# Patient Record
Sex: FEMALE | Race: WHITE | NOT HISPANIC OR LATINO | Employment: OTHER | URBAN - METROPOLITAN AREA
[De-identification: names, ages, dates, MRNs, and addresses within clinical notes are randomized per-mention and may not be internally consistent; named-entity substitution may affect disease eponyms.]

---

## 2017-05-04 ENCOUNTER — ALLSCRIPTS OFFICE VISIT (OUTPATIENT)
Dept: OTHER | Facility: OTHER | Age: 67
End: 2017-05-04

## 2017-05-17 ENCOUNTER — GENERIC CONVERSION - ENCOUNTER (OUTPATIENT)
Dept: OTHER | Facility: OTHER | Age: 67
End: 2017-05-17

## 2017-05-23 ENCOUNTER — ALLSCRIPTS OFFICE VISIT (OUTPATIENT)
Dept: OTHER | Facility: OTHER | Age: 67
End: 2017-05-23

## 2017-05-23 ENCOUNTER — GENERIC CONVERSION - ENCOUNTER (OUTPATIENT)
Dept: OTHER | Facility: OTHER | Age: 67
End: 2017-05-23

## 2017-09-11 ENCOUNTER — GENERIC CONVERSION - ENCOUNTER (OUTPATIENT)
Dept: OTHER | Facility: OTHER | Age: 67
End: 2017-09-11

## 2017-09-11 DIAGNOSIS — Z12.31 ENCOUNTER FOR SCREENING MAMMOGRAM FOR MALIGNANT NEOPLASM OF BREAST: ICD-10-CM

## 2017-10-23 ENCOUNTER — ALLSCRIPTS OFFICE VISIT (OUTPATIENT)
Dept: OTHER | Facility: OTHER | Age: 67
End: 2017-10-23

## 2017-10-24 NOTE — PROGRESS NOTES
Assessment  1  COPD, mild (496) (J44 9)   2  Exertional shortness of breath (786 05) (R06 02)   3  Ear itching (698 9) (L29 9)    Discussion/Summary    Ear itching- advise to take OTC allergy medication she is already on Singulair generic daily advise to continue  Mild and Exertional Shortness of breath- stable with current regime of daily inhaler along with Nebulizer treat,treatment, Placard for disability given to patient  up as needed  Possible side effects of new medications were reviewed with the patient/guardian today  The treatment plan was reviewed with the patient/guardian  The patient/guardian understands and agrees with the treatment plan      Chief Complaint  Patient is here today with complaints of left ear pain and have paperwork for disability placard filled out due to breathing issue      History of Present Illness  Patient is here because her left ear has been bothering her for the past 3 days  She denies any drainage from her ear  It feels itchy says that she cannot breath and walk more than 200 feet without stopping and is requesting a disability placard  She has COPD and is currently on nebulizer treatments along with Bero inhaler daily  Review of Systems    Constitutional: No fever, no chills, feels well, no tiredness, no recent weight gain or weight loss  ENT: as noted in HPI  Cardiovascular: No complaints of slow heart rate, no fast heart rate, no chest pain, no palpitations, no leg claudication, no lower extremity edema  Respiratory: shortness of breath during exertion, but-- as noted in HPI  Gastrointestinal: No complaints of abdominal pain, no constipation, no nausea or vomiting, no diarrhea, no bloody stools  Musculoskeletal: No complaints of arthralgias, no myalgias, no joint swelling or stiffness, no limb pain or swelling  Integumentary: No complaints of skin rash or lesions, no itching, no skin wounds, no breast pain or lump     Neurological: No complaints of headache, no confusion, no convulsions, no numbness, no dizziness or fainting, no tingling, no limb weakness, no difficulty walking  ROS reviewed  Active Problems  1  Anxiety (300 00) (F41 9)   2  Benign hypertension (401 1) (I10)   3  COPD, mild (496) (J44 9)   4  Depression with anxiety (300 4) (F41 8)   5  Encounter for screening mammogram for malignant neoplasm of breast (V76 12)   (Z12 31)   6  Exertional shortness of breath (786 05) (R06 02)   7  GERD (gastroesophageal reflux disease) (530 81) (K21 9)   8  Hypothyroidism (244 9) (E03 9)   9  Need for influenza vaccination (V04 81) (Z23)   10  Need for pneumococcal vaccination (V03 82) (Z23)   11  Recurrent UTI (599 0) (N39 0)   12  Seasonal allergies (477 9) (J30 2)   13  Vaginal dryness (625 8) (N89 8)   14  Vitamin D deficiency (268 9) (E55 9)    Past Medical History    The active problems and past medical history were reviewed and updated today  Family History  Mother    1  Denied: Family history of substance abuse   2  Denied: Family history of Mental problems  Father    3  Family history of cardiac disorder (V17 49) (Z82 49)   4  Denied: Family history of substance abuse   5  Denied: Family history of Mental problems  Sibling    6  Family history of cardiac disorder (V17 49) (Z82 49)    Social History   · Former smoker (E14 44) (U18 381)    Current Meds   1  Albuterol Sulfate (2 5 MG/3ML) 0 083% Inhalation Nebulization Solution; USE 1 UNIT   DOSE EVERY 4-6 HOURS AS NEEDED FOR TPBVVB(151 34) ; Therapy: 79ZXG2331 to (Evaluate:82Xsp9116)  Requested for: 37IAS4272; Last   Rx:15Jun2017 Ordered   2  AmLODIPine Besylate 10 MG Oral Tablet; 1 tab oral every day; Therapy: 50Fht2068 to (Evaluate:10Mar2018)  Requested for: 40Ugn4967; Last   Rx:48Efd1634 Ordered   3  Breo Ellipta 100-25 MCG/INH Inhalation Aerosol Powder Breath Activated; USE 1   INHALATION ONCE DAILY;    Therapy: 14YKQ3326 to (Evaluate:97Edh4828)  Requested for: 22KUW7062; Last   Rx:05Oct2017 Ordered   4  Citalopram Hydrobromide 20 MG Oral Tablet; 1 tab oral every day; Therapy: 52ZBK4904 to (Evaluate:10Mar2018)  Requested for: 83Rui7928; Last   Rx:11Sep2017 Ordered   5  HydrOXYzine Pamoate 25 MG Oral Capsule; TAKE 1 CAPSULE 3 TIMES DAILY; Therapy: 33Rnk4048 to (Evaluate:01Oct2017)  Requested for: 76Moh7932; Last   Rx:12Nbd8418 Ordered   6  Ipratropium-Albuterol 0 5-2 5 (3) MG/3ML Inhalation Solution; use 1 unit dose in nebulizer   every 4 hours as needed; Therapy: 10Enb7056 to (Evaluate:10Dec2017)  Requested for: 03Tvf2866; Last   Rx:11Sep2017 Ordered   7  Levothyroxine Sodium 88 MCG Oral Tablet; TAKE 1 TABLET DAILY AS DIRECTED    Requested for: 11Sep2017; Last Rx:11Sep2017 Ordered   8  Montelukast Sodium 10 MG Oral Tablet; take 1 tablet by mouth once daily; Therapy: 00Usa3221 to (Evaluate:10Nov2017)  Requested for: 34Asm7269; Last   Rx:11Sep2017 Ordered   9  Nebulizer Device; USE AS DIRECTED; Therapy: 80GWU7326 to (Last Rx:23May2017) Ordered   10  Nebulizer Device; USE AS DIRECTED; Therapy: 68ODG0982 to (Last Rx:25May2017) Ordered   11  Nebulizer/Adult Mask KIT; USE AS DIRECTED; Therapy: 67MBH6228 to (Last Rx:23May2017) Ordered   12  Nebulizer/Adult Mask KIT; USE AS DIRECTED; Therapy: 51SBS8890 to (Last Rx:25May2017) Ordered   13  Nebulizer/Tubing/Mouthpiece KIT; USE AS DIRECTED; Therapy: 42SRR5293 to (Last Rx:25May2017) Ordered   14  Nebulizer/Tubing/Mouthpiece KIT; USE AS DIRECTED; Therapy: 87AAI1188 to (Last Rx:25May2017) Ordered   15  Premarin 0 625 MG Oral Tablet; TAKE 2 TABLET Weekly  Requested for: 29OPF3746; Last    Rx:10Oct2017 Ordered   16  RaNITidine HCl - 150 MG Oral Tablet; TAKE 1 TABLET AT BEDTIME; Therapy: 24QXX8480 to (Evaluate:10Mar2018)  Requested for: 85Xxy4344; Last    Rx:72Ijq9667 Ordered   17  Sulfamethoxazole-Trimethoprim 400-80 MG Oral Tablet; TAKE 1 TABLET Other     Requested for: 05ACG2093; Last Rx:97Ceq3034 Ordered   18   Vitamin D3 66102 UNIT Oral Capsule; Therapy: (Recorded:44Dly2422) to Recorded    Allergies  1  Chantix TABS   2  LaMICtal TABS   3  Wellbutrin TABS    Vitals  Vital Signs    Recorded: 27GZX9917 10:58AM   Temperature 98 1 F   Heart Rate 94   Systolic 164   Diastolic 78   Height 5 ft 2 in   Weight 195 lb    BMI Calculated 35 67   BSA Calculated 1 89   O2 Saturation 96     Physical Exam    Constitutional   General appearance: No acute distress, well appearing and well nourished  Eyes   Conjunctiva and lids: No swelling, erythema or discharge  Ears, Nose, Mouth, and Throat   Otoscopic examination: Tympanic membranes translucent with normal light reflex  Canals patent without erythema  Pulmonary   Respiratory effort: No increased work of breathing or signs of respiratory distress  Auscultation of lungs: Clear to auscultation  Cardiovascular   Auscultation of heart: Normal rate and rhythm, normal S1 and S2, without murmurs      Musculoskeletal   Gait and station: Normal     Psychiatric   Orientation to person, place, and time: Normal          Signatures   Electronically signed by : Urszula Salcedo MD; Oct 23 2017 11:25AM EST                       (Author)

## 2017-12-27 ENCOUNTER — GENERIC CONVERSION - ENCOUNTER (OUTPATIENT)
Dept: FAMILY MEDICINE CLINIC | Facility: CLINIC | Age: 67
End: 2017-12-27

## 2018-01-10 ENCOUNTER — ALLSCRIPTS OFFICE VISIT (OUTPATIENT)
Dept: OTHER | Facility: OTHER | Age: 68
End: 2018-01-10

## 2018-01-10 DIAGNOSIS — I10 ESSENTIAL (PRIMARY) HYPERTENSION: ICD-10-CM

## 2018-01-10 DIAGNOSIS — Z12.31 ENCOUNTER FOR SCREENING MAMMOGRAM FOR MALIGNANT NEOPLASM OF BREAST: ICD-10-CM

## 2018-01-10 DIAGNOSIS — J44.9 CHRONIC OBSTRUCTIVE PULMONARY DISEASE (HCC): ICD-10-CM

## 2018-01-10 DIAGNOSIS — Z13.220 ENCOUNTER FOR SCREENING FOR LIPOID DISORDERS: ICD-10-CM

## 2018-01-11 NOTE — PROGRESS NOTES
Assessment   1  Encounter for well woman exam with routine gynecological exam (V72 31) (Z01 419)   2  COPD, mild (496) (J44 9)   3  Lipid screening (V77 91) (Z13 220)    Plan   Anxiety    · HydrOXYzine Pamoate 25 MG Oral Capsule  Benign hypertension    · (1) COMPREHENSIVE METABOLIC PANEL; Status:Active; Requested JOJ:[id number]; Benign hypertension, Lipid screening    · (1) LIPID PANEL, FASTING; Status:Active; Requested AUU:89OOP7569;   COPD, mild    · Azithromycin 500 MG Oral Tablet; TAKE 1 TABLET DAILY   · PredniSONE 20 MG Oral Tablet; take 2 tablet daily   · * XR CHEST PA & LATERAL; Status:Active; Requested IRN:[id number];   Encounter for screening mammogram for malignant neoplasm of breast    · * MAMMO SCREENING BILATERAL W CAD; Status:Hold For - Scheduling; Requested    for:10Jan2018;   Encounter for well woman exam with routine gynecological exam    · 1 - Ulysses Scudder MD, Laura Osborne  (Obstetrics/Gynecology) Co-Management  *  Status: Hold    For - Scheduling  Requested for: 02UWY9736  Care Summary provided  : Yes  Exertional shortness of breath    · Nebulizer Device   · Nebulizer/Adult Mask KIT   · Nebulizer/Tubing/Mouthpiece KIT; USE AS DIRECTED    Discussion/Summary      Mild COPD- after discussing risks and benefits of medications along with side effects will start a trial of Prednisone 40 mg PO X 7 days, Azithromycin to cover for bacterial infection, given an X-ray to do if symptoms continue and advised to use nebulizer treatment as well  woman exam- mammography ordered given along with a referral to OB/Gyn  up in 1 week or as needed  Possible side effects of new medications were reviewed with the patient/guardian today  The treatment plan was reviewed with the patient/guardian   The patient/guardian understands and agrees with the treatment plan      Chief Complaint   Patient is here today with complaints of chest congestion with cough and bringing up some sputum started yesterday      History of Present Illness   Patient is here due to chest congestion congestion and cough  She says that the cough is productive in nature  She says that she has shortness of breath associated with it  She denies any fevers however she has some body aches  She quit smoking 5 years ago, she used to smoke 1 PPD, she smoked for about 30 years  is due for mammography and a pap smear  Review of Systems        Constitutional: No fever, no chills, feels well, no tiredness, no recent weight gain or weight loss  Eyes: No complaints of eye pain, no red eyes, no eyesight problems, no discharge, no dry eyes, no itching of eyes  Cardiovascular: No complaints of slow heart rate, no fast heart rate, no chest pain, no palpitations, no leg claudication, no lower extremity edema  Respiratory: shortness of breath-- and-- cough, but-- as noted in HPI  Gastrointestinal: No complaints of abdominal pain, no constipation, no nausea or vomiting, no diarrhea, no bloody stools  Musculoskeletal: No complaints of arthralgias, no myalgias, no joint swelling or stiffness, no limb pain or swelling  Integumentary: No complaints of skin rash or lesions, no itching, no skin wounds, no breast pain or lump  Neurological: No complaints of headache, no confusion, no convulsions, no numbness, no dizziness or fainting, no tingling, no limb weakness, no difficulty walking  Endocrine: No complaints of proptosis, no hot flashes, no muscle weakness, no deepening of the voice, no feelings of weakness  Hematologic/Lymphatic: No complaints of swollen glands, no swollen glands in the neck, does not bleed easily, does not bruise easily  ROS reviewed  Active Problems   1  Anxiety (300 00) (F41 9)   2  Benign hypertension (401 1) (I10)   3  COPD, mild (496) (J44 9)   4  Depression with anxiety (300 4) (F41 8)   5  Ear itching (698 9) (L29 9)   6   Encounter for screening mammogram for malignant neoplasm of breast (V76 12) (Z12 31)   7  Exertional shortness of breath (786 05) (R06 02)   8  GERD (gastroesophageal reflux disease) (530 81) (K21 9)   9  Hypothyroidism (244 9) (E03 9)   10  Need for influenza vaccination (V04 81) (Z23)   11  Need for pneumococcal vaccination (V03 82) (Z23)   12  Recurrent UTI (599 0) (N39 0)   13  Seasonal allergies (477 9) (J30 2)   14  Shortness of breath on exertion (786 05) (R06 02)   15  Vaginal dryness (625 8) (N89 8)   16  Vitamin D deficiency (268 9) (E55 9)    Past Medical History      The active problems and past medical history were reviewed and updated today  Family History   Mother    1  Denied: Family history of substance abuse   2  Denied: Family history of Mental problems  Father    3  Family history of cardiac disorder (V17 49) (Z82 49)   4  Denied: Family history of substance abuse   5  Denied: Family history of Mental problems  Sibling    6  Family history of cardiac disorder (V17 49) (Z82 49)    Social History    · Former smoker (J44 14) (U23 484)    Current Meds    1  Albuterol Sulfate (2 5 MG/3ML) 0 083% Inhalation Nebulization Solution; USE 1 UNIT     DOSE EVERY 4-6 HOURS AS NEEDED FOR UFVLYN(139 59) ; Therapy: 70YFS5488 to (Evaluate:54Rxr6360)  Requested for: 22CJH3219; Last     Rx:15Jun2017 Ordered   2  AmLODIPine Besylate 10 MG Oral Tablet; 1 tab oral every day; Therapy: 03Gid6928 to (Evaluate:10Mar2018)  Requested for: 15Gwe8669; Last     Rx:73Tfv6356 Ordered   3  Breo Ellipta 100-25 MCG/INH Inhalation Aerosol Powder Breath Activated; USE 1     INHALATION ONCE DAILY; Therapy: 46BEU3332 to (Evaluate:82Dob3865)  Requested for: 08CWG6295; Last     Rx:06Muq7266 Ordered   4  Citalopram Hydrobromide 20 MG Oral Tablet; 1 tab oral every day; Therapy: 80ANA3475 to (Evaluate:10Mar2018)  Requested for: 86Fou2168; Last     Rx:04Nja4636 Ordered   5  Fluticasone Propionate 50 MCG/ACT Nasal Suspension; use 2 sprays in each nostril     once daily;      Therapy: 58FLS2004 to (Evaluate:01Feb2018)  Requested for: 81HQD6766; Last     Rx:02Jan2018 Ordered   6  HydrOXYzine Pamoate 25 MG Oral Capsule; TAKE 1 CAPSULE 3 TIMES DAILY; Therapy: 45Dry3036 to (Evaluate:01Oct2017)  Requested for: 25Oml3510; Last     Rx:93Acl3522 Ordered   7  Ipratropium-Albuterol 0 5-2 5 (3) MG/3ML Inhalation Solution; use 1 unit dose in nebulizer     every 4 hours as needed; Therapy: 88Lmd3505 to (Evaluate:02Yis2891)  Requested for: 35Qcj4595; Last     Rx:10Uwf3216 Ordered   8  Levothyroxine Sodium 88 MCG Oral Tablet; TAKE 1 TABLET DAILY AS DIRECTED      Requested for: 59Unv4616; Last Rx:38Akf0484 Ordered   9  Montelukast Sodium 10 MG Oral Tablet; take 1 tablet by mouth once daily; Therapy: 94Vsl2959 to (Evaluate:10Nov2017)  Requested for: 27Bzv4669; Last     Rx:11Sep2017 Ordered   10  Nebulizer Device; USE AS DIRECTED; Therapy: 67PEU7466 to (Last Rx:46Xob8161) Ordered   11  Nebulizer Device; USE AS DIRECTED; Therapy: 56SNN1716 to (Last Rx:66Rph6467) Ordered   12  Nebulizer/Adult Mask KIT; USE AS DIRECTED; Therapy: 78FLK4510 to (Last Rx:04Nvb6090) Ordered   13  Nebulizer/Adult Mask KIT; USE AS DIRECTED; Therapy: 74BID8741 to (Last Rx:25May2017) Ordered   14  Nebulizer/Tubing/Mouthpiece KIT; USE AS DIRECTED; Therapy: 91UVP7356 to (Last Rx:63Oee6759) Ordered   15  Nebulizer/Tubing/Mouthpiece KIT; USE AS DIRECTED; Therapy: 92JIF8684 to (Last Rx:25May2017) Ordered   16  Premarin 0 625 MG Oral Tablet; TAKE 2 TABLET Weekly  Requested for: 17BKO8837; Last      Rx:10Oct2017 Ordered   17  RaNITidine HCl - 150 MG Oral Tablet; TAKE 1 TABLET AT BEDTIME; Therapy: 79WUA7225 to (Evaluate:10Mar2018)  Requested for: 70Sxc1075; Last      Rx:11Sep2017 Ordered   18  Sulfamethoxazole-Trimethoprim 400-80 MG Oral Tablet; TAKE 1 TABLET Other       Requested for: 32ZXG9769; Last Rx:43Log2673 Ordered   19   Vitamin D3 47698 UNIT Oral Capsule; TAKE 1 CAPSULE Weekly  Requested for:      83VDP6055; Last Rx:26Aom6856 Ordered    Allergies   1  Chantix TABS   2  LaMICtal TABS   3  Wellbutrin TABS    Vitals   Vital Signs    Recorded: 18HXF4639 09:13AM   Temperature 98 2 F   Heart Rate 560   Systolic 616   Diastolic 76   Height 5 ft 2 in   Weight 192 lb 6 oz   BMI Calculated 35 19   BSA Calculated 1 88   O2 Saturation 95     Physical Exam        Constitutional      General appearance: No acute distress, well appearing and well nourished  Eyes      Conjunctiva and lids: No swelling, erythema or discharge  Pulmonary      Respiratory effort: No increased work of breathing or signs of respiratory distress  -- bilateral end exp wheezing  Cardiovascular      Auscultation of heart: Normal rate and rhythm, normal S1 and S2, without murmurs  Musculoskeletal      Gait and station: Normal        Skin      Skin and subcutaneous tissue: Normal without rashes or lesions         Psychiatric      Orientation to person, place, and time: Normal        Mood and affect: Normal           Signatures    Electronically signed by : Ana María Joseph MD; Harjeet 10 2018  9:54AM EST                       (Author)

## 2018-01-13 ENCOUNTER — APPOINTMENT (EMERGENCY)
Dept: RADIOLOGY | Facility: HOSPITAL | Age: 68
DRG: 192 | End: 2018-01-13
Payer: MEDICARE

## 2018-01-13 ENCOUNTER — HOSPITAL ENCOUNTER (INPATIENT)
Facility: HOSPITAL | Age: 68
LOS: 1 days | Discharge: HOME | DRG: 192 | End: 2018-01-14
Attending: FAMILY MEDICINE | Admitting: FAMILY MEDICINE
Payer: MEDICARE

## 2018-01-13 VITALS
HEIGHT: 62 IN | BODY MASS INDEX: 34.78 KG/M2 | OXYGEN SATURATION: 95 % | HEART RATE: 96 BPM | WEIGHT: 189 LBS | DIASTOLIC BLOOD PRESSURE: 74 MMHG | TEMPERATURE: 97.9 F | SYSTOLIC BLOOD PRESSURE: 130 MMHG

## 2018-01-13 VITALS
DIASTOLIC BLOOD PRESSURE: 78 MMHG | BODY MASS INDEX: 35.88 KG/M2 | OXYGEN SATURATION: 96 % | WEIGHT: 195 LBS | SYSTOLIC BLOOD PRESSURE: 136 MMHG | HEART RATE: 94 BPM | HEIGHT: 62 IN | TEMPERATURE: 98.1 F

## 2018-01-13 DIAGNOSIS — J44.1 COPD EXACERBATION (HCC): ICD-10-CM

## 2018-01-13 DIAGNOSIS — J18.9 PNEUMONIA: Primary | ICD-10-CM

## 2018-01-13 PROBLEM — I10 ESSENTIAL HYPERTENSION: Status: ACTIVE | Noted: 2018-01-13

## 2018-01-13 PROBLEM — R06.03 ACUTE RESPIRATORY DISTRESS: Status: ACTIVE | Noted: 2018-01-13

## 2018-01-13 PROBLEM — E03.9 HYPOTHYROIDISM: Status: ACTIVE | Noted: 2018-01-13

## 2018-01-13 LAB
ALBUMIN SERPL BCP-MCNC: 3.7 G/DL (ref 3.5–5)
ALP SERPL-CCNC: 74 U/L (ref 46–116)
ALT SERPL W P-5'-P-CCNC: 28 U/L (ref 12–78)
ANION GAP SERPL CALCULATED.3IONS-SCNC: 11 MMOL/L (ref 4–13)
APTT PPP: 26 SECONDS (ref 23–35)
AST SERPL W P-5'-P-CCNC: 30 U/L (ref 5–45)
BASOPHILS # BLD AUTO: 0 THOUSANDS/ΜL (ref 0–0.1)
BASOPHILS NFR BLD AUTO: 0 % (ref 0–1)
BILIRUB SERPL-MCNC: 0.3 MG/DL (ref 0.2–1)
BUN SERPL-MCNC: 18 MG/DL (ref 5–25)
CALCIUM SERPL-MCNC: 9.3 MG/DL (ref 8.3–10.1)
CHLORIDE SERPL-SCNC: 104 MMOL/L (ref 100–108)
CO2 SERPL-SCNC: 24 MMOL/L (ref 21–32)
CREAT SERPL-MCNC: 0.58 MG/DL (ref 0.6–1.3)
EOSINOPHIL # BLD AUTO: 0 THOUSAND/ΜL (ref 0–0.61)
EOSINOPHIL NFR BLD AUTO: 0 % (ref 0–6)
ERYTHROCYTE [DISTWIDTH] IN BLOOD BY AUTOMATED COUNT: 13.4 % (ref 11.6–15.1)
FLUAV AG SPEC QL: DETECTED
FLUBV AG SPEC QL: ABNORMAL
GFR SERPL CREATININE-BSD FRML MDRD: 95 ML/MIN/1.73SQ M
GLUCOSE SERPL-MCNC: 139 MG/DL (ref 65–140)
HCT VFR BLD AUTO: 41.2 % (ref 34.8–46.1)
HGB BLD-MCNC: 13.6 G/DL (ref 11.5–15.4)
INR PPP: 0.87 (ref 0.86–1.16)
LACTATE SERPL-SCNC: 1.1 MMOL/L (ref 0.5–2)
LIPASE SERPL-CCNC: 140 U/L (ref 73–393)
LYMPHOCYTES # BLD AUTO: 0.34 THOUSANDS/ΜL (ref 0.6–4.47)
LYMPHOCYTES NFR BLD AUTO: 9 % (ref 14–44)
MAGNESIUM SERPL-MCNC: 2.3 MG/DL (ref 1.6–2.6)
MCH RBC QN AUTO: 30.2 PG (ref 26.8–34.3)
MCHC RBC AUTO-ENTMCNC: 33 G/DL (ref 31.4–37.4)
MCV RBC AUTO: 91 FL (ref 82–98)
MONOCYTES # BLD AUTO: 0.26 THOUSAND/ΜL (ref 0.17–1.22)
MONOCYTES NFR BLD AUTO: 7 % (ref 4–12)
NEUTROPHILS # BLD AUTO: 3.33 THOUSANDS/ΜL (ref 1.85–7.62)
NEUTS SEG NFR BLD AUTO: 84 % (ref 43–75)
PLATELET # BLD AUTO: 239 THOUSANDS/UL (ref 149–390)
PMV BLD AUTO: 8.9 FL (ref 8.9–12.7)
POTASSIUM SERPL-SCNC: 4 MMOL/L (ref 3.5–5.3)
PROT SERPL-MCNC: 7.6 G/DL (ref 6.4–8.2)
PROTHROMBIN TIME: 12.1 SECONDS (ref 12.1–14.4)
RBC # BLD AUTO: 4.51 MILLION/UL (ref 3.81–5.12)
RSV B RNA SPEC QL NAA+PROBE: ABNORMAL
SODIUM SERPL-SCNC: 139 MMOL/L (ref 136–145)
TSH SERPL DL<=0.05 MIU/L-ACNC: 0.62 UIU/ML (ref 0.36–3.74)
WBC # BLD AUTO: 3.93 THOUSAND/UL (ref 4.31–10.16)

## 2018-01-13 PROCEDURE — 83605 ASSAY OF LACTIC ACID: CPT | Performed by: PHYSICIAN ASSISTANT

## 2018-01-13 PROCEDURE — 96361 HYDRATE IV INFUSION ADD-ON: CPT

## 2018-01-13 PROCEDURE — 94760 N-INVAS EAR/PLS OXIMETRY 1: CPT

## 2018-01-13 PROCEDURE — 71046 X-RAY EXAM CHEST 2 VIEWS: CPT

## 2018-01-13 PROCEDURE — 94644 CONT INHLJ TX 1ST HOUR: CPT

## 2018-01-13 PROCEDURE — 84443 ASSAY THYROID STIM HORMONE: CPT | Performed by: PHYSICIAN ASSISTANT

## 2018-01-13 PROCEDURE — 80053 COMPREHEN METABOLIC PANEL: CPT | Performed by: FAMILY MEDICINE

## 2018-01-13 PROCEDURE — 93005 ELECTROCARDIOGRAM TRACING: CPT

## 2018-01-13 PROCEDURE — 83690 ASSAY OF LIPASE: CPT | Performed by: FAMILY MEDICINE

## 2018-01-13 PROCEDURE — 83735 ASSAY OF MAGNESIUM: CPT | Performed by: PHYSICIAN ASSISTANT

## 2018-01-13 PROCEDURE — 87040 BLOOD CULTURE FOR BACTERIA: CPT | Performed by: PHYSICIAN ASSISTANT

## 2018-01-13 PROCEDURE — 85610 PROTHROMBIN TIME: CPT | Performed by: PHYSICIAN ASSISTANT

## 2018-01-13 PROCEDURE — 99285 EMERGENCY DEPT VISIT HI MDM: CPT

## 2018-01-13 PROCEDURE — 85730 THROMBOPLASTIN TIME PARTIAL: CPT | Performed by: PHYSICIAN ASSISTANT

## 2018-01-13 PROCEDURE — 96365 THER/PROPH/DIAG IV INF INIT: CPT

## 2018-01-13 PROCEDURE — 94640 AIRWAY INHALATION TREATMENT: CPT

## 2018-01-13 PROCEDURE — 93005 ELECTROCARDIOGRAM TRACING: CPT | Performed by: PHYSICIAN ASSISTANT

## 2018-01-13 PROCEDURE — 87798 DETECT AGENT NOS DNA AMP: CPT | Performed by: PHYSICIAN ASSISTANT

## 2018-01-13 PROCEDURE — 96375 TX/PRO/DX INJ NEW DRUG ADDON: CPT

## 2018-01-13 PROCEDURE — 94664 DEMO&/EVAL PT USE INHALER: CPT

## 2018-01-13 PROCEDURE — 36415 COLL VENOUS BLD VENIPUNCTURE: CPT

## 2018-01-13 PROCEDURE — 85025 COMPLETE CBC W/AUTO DIFF WBC: CPT | Performed by: FAMILY MEDICINE

## 2018-01-13 RX ORDER — MELATONIN
1000 WEEKLY
COMMUNITY

## 2018-01-13 RX ORDER — ALBUTEROL SULFATE 2.5 MG/3ML
2.5 SOLUTION RESPIRATORY (INHALATION)
Status: DISCONTINUED | OUTPATIENT
Start: 2018-01-13 | End: 2018-01-13

## 2018-01-13 RX ORDER — LEVOTHYROXINE SODIUM 88 UG/1
125 TABLET ORAL DAILY
COMMUNITY

## 2018-01-13 RX ORDER — ALBUTEROL SULFATE 2.5 MG/3ML
5 SOLUTION RESPIRATORY (INHALATION) ONCE
Status: COMPLETED | OUTPATIENT
Start: 2018-01-13 | End: 2018-01-13

## 2018-01-13 RX ORDER — FLUTICASONE FUROATE AND VILANTEROL 200; 25 UG/1; UG/1
1 POWDER RESPIRATORY (INHALATION) DAILY
COMMUNITY

## 2018-01-13 RX ORDER — PREDNISONE 20 MG/1
40 TABLET ORAL DAILY
COMMUNITY

## 2018-01-13 RX ORDER — METHYLPREDNISOLONE SODIUM SUCCINATE 40 MG/ML
40 INJECTION, POWDER, LYOPHILIZED, FOR SOLUTION INTRAMUSCULAR; INTRAVENOUS EVERY 8 HOURS
Status: DISCONTINUED | OUTPATIENT
Start: 2018-01-13 | End: 2018-01-14 | Stop reason: HOSPADM

## 2018-01-13 RX ORDER — SODIUM CHLORIDE, SODIUM LACTATE, POTASSIUM CHLORIDE, CALCIUM CHLORIDE 600; 310; 30; 20 MG/100ML; MG/100ML; MG/100ML; MG/100ML
50 INJECTION, SOLUTION INTRAVENOUS CONTINUOUS
Status: DISCONTINUED | OUTPATIENT
Start: 2018-01-13 | End: 2018-01-14 | Stop reason: HOSPADM

## 2018-01-13 RX ORDER — SODIUM CHLORIDE FOR INHALATION 0.9 %
3 VIAL, NEBULIZER (ML) INHALATION EVERY 4 HOURS PRN
Status: DISCONTINUED | OUTPATIENT
Start: 2018-01-13 | End: 2018-01-13

## 2018-01-13 RX ORDER — OSELTAMIVIR PHOSPHATE 75 MG/1
75 CAPSULE ORAL EVERY 12 HOURS SCHEDULED
Status: DISCONTINUED | OUTPATIENT
Start: 2018-01-13 | End: 2018-01-14 | Stop reason: HOSPADM

## 2018-01-13 RX ORDER — AZITHROMYCIN 500 MG/1
500 TABLET, FILM COATED ORAL DAILY
COMMUNITY

## 2018-01-13 RX ORDER — METHYLPREDNISOLONE SODIUM SUCCINATE 125 MG/2ML
80 INJECTION, POWDER, LYOPHILIZED, FOR SOLUTION INTRAMUSCULAR; INTRAVENOUS ONCE
Status: COMPLETED | OUTPATIENT
Start: 2018-01-13 | End: 2018-01-13

## 2018-01-13 RX ORDER — ALBUTEROL SULFATE 2.5 MG/3ML
10 SOLUTION RESPIRATORY (INHALATION) ONCE
Status: COMPLETED | OUTPATIENT
Start: 2018-01-13 | End: 2018-01-13

## 2018-01-13 RX ORDER — LANOLIN ALCOHOL/MO/W.PET/CERES
3 CREAM (GRAM) TOPICAL
Status: DISCONTINUED | OUTPATIENT
Start: 2018-01-13 | End: 2018-01-14

## 2018-01-13 RX ORDER — AMLODIPINE BESYLATE 10 MG/1
10 TABLET ORAL DAILY
COMMUNITY
End: 2018-04-29 | Stop reason: SDUPTHER

## 2018-01-13 RX ORDER — OSELTAMIVIR PHOSPHATE 75 MG/1
75 CAPSULE ORAL ONCE
Status: COMPLETED | OUTPATIENT
Start: 2018-01-13 | End: 2018-01-13

## 2018-01-13 RX ORDER — BUDESONIDE 0.5 MG/2ML
0.5 INHALANT ORAL
Status: DISCONTINUED | OUTPATIENT
Start: 2018-01-13 | End: 2018-01-14 | Stop reason: HOSPADM

## 2018-01-13 RX ORDER — CITALOPRAM 20 MG/1
20 TABLET ORAL DAILY
COMMUNITY

## 2018-01-13 RX ORDER — ALBUTEROL SULFATE 2.5 MG/3ML
2.5 SOLUTION RESPIRATORY (INHALATION) EVERY 6 HOURS PRN
Status: DISCONTINUED | OUTPATIENT
Start: 2018-01-13 | End: 2018-01-14 | Stop reason: HOSPADM

## 2018-01-13 RX ORDER — LEVALBUTEROL 1.25 MG/.5ML
1.25 SOLUTION, CONCENTRATE RESPIRATORY (INHALATION)
Status: DISCONTINUED | OUTPATIENT
Start: 2018-01-13 | End: 2018-01-14 | Stop reason: HOSPADM

## 2018-01-13 RX ADMIN — OSELTAMIVIR PHOSPHATE 75 MG: 75 CAPSULE ORAL at 22:17

## 2018-01-13 RX ADMIN — ALBUTEROL SULFATE 5 MG: 2.5 SOLUTION RESPIRATORY (INHALATION) at 13:28

## 2018-01-13 RX ADMIN — SODIUM CHLORIDE, POTASSIUM CHLORIDE, SODIUM LACTATE AND CALCIUM CHLORIDE 100 ML/HR: 600; 310; 30; 20 INJECTION, SOLUTION INTRAVENOUS at 18:48

## 2018-01-13 RX ADMIN — OSELTAMIVIR PHOSPHATE 75 MG: 75 CAPSULE ORAL at 15:17

## 2018-01-13 RX ADMIN — IPRATROPIUM BROMIDE 1 MG: 0.5 SOLUTION RESPIRATORY (INHALATION) at 14:32

## 2018-01-13 RX ADMIN — DOXYLAMINE SUCCINATE 12.5 MG: 25 TABLET ORAL at 23:47

## 2018-01-13 RX ADMIN — AZITHROMYCIN MONOHYDRATE 500 MG: 500 INJECTION, POWDER, LYOPHILIZED, FOR SOLUTION INTRAVENOUS at 15:18

## 2018-01-13 RX ADMIN — CEFTRIAXONE SODIUM 1000 MG: 10 INJECTION, POWDER, FOR SOLUTION INTRAVENOUS at 14:30

## 2018-01-13 RX ADMIN — METHYLPREDNISOLONE SODIUM SUCCINATE 80 MG: 125 INJECTION, POWDER, FOR SOLUTION INTRAMUSCULAR; INTRAVENOUS at 13:29

## 2018-01-13 RX ADMIN — METHYLPREDNISOLONE SODIUM SUCCINATE 40 MG: 40 INJECTION, POWDER, FOR SOLUTION INTRAMUSCULAR; INTRAVENOUS at 22:09

## 2018-01-13 RX ADMIN — IPRATROPIUM BROMIDE 0.5 MG: 0.5 SOLUTION RESPIRATORY (INHALATION) at 13:28

## 2018-01-13 RX ADMIN — MELATONIN 3 MG: 3 TAB ORAL at 22:09

## 2018-01-13 RX ADMIN — BUDESONIDE 0.5 MG: 0.5 INHALANT RESPIRATORY (INHALATION) at 19:57

## 2018-01-13 RX ADMIN — SODIUM CHLORIDE 1000 ML: 0.9 INJECTION, SOLUTION INTRAVENOUS at 13:17

## 2018-01-13 RX ADMIN — LEVALBUTEROL HYDROCHLORIDE 1.25 MG: 1.25 SOLUTION, CONCENTRATE RESPIRATORY (INHALATION) at 19:57

## 2018-01-13 RX ADMIN — IPRATROPIUM BROMIDE 0.5 MG: 0.5 SOLUTION RESPIRATORY (INHALATION) at 19:57

## 2018-01-13 RX ADMIN — ALBUTEROL SULFATE 10 MG: 2.5 SOLUTION RESPIRATORY (INHALATION) at 14:32

## 2018-01-13 NOTE — ED NOTES
Patient ok with FN being initiated and verbalized understanding that additional labs may be added on by physician which require additional blood work to be drawn  Patient verbalizes understanding of this being ok       Jemal Michele RN  01/13/18 8597

## 2018-01-13 NOTE — H&P
H&P- Kris Gonzalez 1950, 75 y o  female MRN: [id number]    Unit/Bed#: -01 Encounter: [id number]    Primary Care Provider: Genny Vegas MD   Date and time admitted to hospital: 1/13/2018 12:11 PM        * Acute respiratory distress   Assessment & Plan    Likely secondary to acute  COPD exacerbation in the setting of community-acquired pneumonia vs acute bronchitis   Follow chest x-ray  Continue IV antibiotic  Scheduled nebulizer  Incentive spirometry          COPD with acute exacerbation ([facility] [address] )   Assessment & Plan    Previous history of mild  COPD on BREO-ELLIPTA  Cont respiratory therapy   Follows x-ray        Hypothyroidism   Assessment & Plan    Continue levothyroxine          Essential hypertension   Assessment & Plan    Stable  Continue home medication              VTE Prophylaxis: Enoxaparin (Lovenox)  / sequential compression device   Code Status:  Full code  POLST: There is no POLST form on file for this patient (pre-hospital)    Anticipated Length of Stay:  Patient will be admitted on an Observation basis with an anticipated length of stay of  > 2 midnights  Justification for Hospital Stay:  Acute respiratory failure secondary to acute COPD exacerbation requiring IV steroids    Total Time for Visit, including Counseling / Coordination of Care: 45 minutes  Greater than 50% of this total time spent on direct patient counseling and coordination of care  Chief Complaint:   Short of breath    History of Present Illness:    Kris Gonzalez is a 75 y o  female significant past medical history of HTN, hypothyroidism, COPD mild who comes today complaining short of breath with has been getting worse for the last 24 hours  Patient report short of breath that started 1 weeks ago but today she was not able to even walk to a difficulty breathing    Patient reports she was seen by PCP couple days ago because she was constantly coughing with wheezing short of breath headache joint pain reason why she was started on p  o  antibiotic steroid without relief of symptoms  Review of Systems:    Review of Systems   Constitutional: Positive for activity change  Negative for appetite change, chills, diaphoresis, fatigue, fever and unexpected weight change  HENT: Negative for congestion and dental problem  Eyes: Negative for pain and discharge  Respiratory: Positive for cough, chest tightness, shortness of breath and wheezing  Negative for stridor  Cardiovascular: Negative for chest pain, palpitations and leg swelling  Gastrointestinal: Negative for abdominal distention, abdominal pain, anal bleeding, blood in stool and diarrhea  Endocrine: Negative for cold intolerance  Genitourinary: Negative for difficulty urinating, dyspareunia, dysuria and enuresis  Musculoskeletal: Negative for arthralgias, back pain, gait problem and joint swelling  Neurological: Positive for dizziness  Negative for facial asymmetry and light-headedness  Hematological: Negative for adenopathy  Psychiatric/Behavioral: Negative for agitation  Past Medical and Surgical History:     Past Medical History:   Diagnosis Date    COPD (chronic obstructive pulmonary disease) ([facility] [address] )     Depression     Hypertension     Hypothyroid        Past Surgical History:   Procedure Laterality Date    NO PAST SURGERIES         Meds/Allergies:    Prior to Admission medications    Medication Sig Start Date End Date Taking?  Authorizing Provider   amLODIPine (NORVASC) 10 mg tablet Take 10 mg by mouth daily   Yes Historical Provider, MD   azithromycin (ZITHROMAX) 500 MG tablet Take 500 mg by mouth daily   Yes Historical Provider, MD   citalopram (CeleXA) 20 mg tablet Take 10 mg by mouth daily   Yes Historical Provider, MD   fluticasone furoate-vilanterol (BREO ELLIPTA) 200-25 MCG/INH inhaler Inhale 1 puff daily Dose unknown   Yes Historical Provider, MD   levothyroxine 100 mcg tablet Take 88 mcg by mouth daily   Yes Historical Provider, MD predniSONE 20 mg tablet Take 40 mg by mouth daily   Yes Historical Provider, MD     I have reviewed home medications with patient personally  Allergies: Allergies   Allergen Reactions    Lamisil [Terbinafine] Rash    Chantix [Varenicline] Other (See Comments)     aggitation    Wellbutrin [Bupropion]      aggitation       Social History:     Marital Status: /Civil Union   Occupation:   Patient Pre-hospital Living Situation:   Patient Pre-hospital Level of Mobility:   Patient Pre-hospital Diet Restrictions:   Substance Use History:   History   Alcohol Use No     History   Smoking Status    Never Smoker   Smokeless Tobacco    Never Used     History   Drug Use No       Family History:    non-contributory    Physical Exam:     Vitals:   Blood Pressure: 152/82 (01/13/18 1523)  Pulse: 102 (01/13/18 1523)  Temperature: 98 2 °F (36 8 °C) (01/13/18 1153)  Temp Source: Oral (01/13/18 1153)  Respirations: 20 (01/13/18 1523)  Height: 5' 2" (157 5 cm) (01/13/18 1144)  Weight - Scale: 86 1 kg (189 lb 12 8 oz) (01/13/18 1144)  SpO2: 100 % (receiving heart neb) (01/13/18 1523)    Physical Exam   Constitutional: She is oriented to person, place, and time  No distress  Acutely ill appearance   Cardiovascular: Normal rate and normal heart sounds  Exam reveals no gallop  No murmur heard  Pulmonary/Chest: She has wheezes  She has no rales  She exhibits no tenderness  Abdominal: Soft  She exhibits no distension and no mass  There is no tenderness  There is no rebound and no guarding  Obesity   Musculoskeletal: She exhibits no edema, tenderness or deformity  Neurological: She is alert and oriented to person, place, and time  She has normal reflexes  She displays normal reflexes  No cranial nerve deficit  Coordination normal    Skin: Skin is warm  She is not diaphoretic  Psychiatric: She has a normal mood and affect  Additional Data:     Lab Results: I have personally reviewed pertinent reports  Results from last 7 days  Lab Units 01/13/18  1311   WBC Thousand/uL 3 93*   HEMOGLOBIN g/dL 13 6   HEMATOCRIT % 41 2   PLATELETS Thousands/uL 239   NEUTROS PCT % 84*   LYMPHS PCT % 9*   MONOS PCT % 7   EOS PCT % 0       Results from last 7 days  Lab Units 01/13/18  1311   SODIUM mmol/L 139   POTASSIUM mmol/L 4 0   CHLORIDE mmol/L 104   CO2 mmol/L 24   BUN mg/dL 18   CREATININE mg/dL 0 58*   CALCIUM mg/dL 9 3   TOTAL PROTEIN g/dL 7 6   BILIRUBIN TOTAL mg/dL 0 30   ALK PHOS U/L 74   ALT U/L 28   AST U/L 30   GLUCOSE RANDOM mg/dL 139       Results from last 7 days  Lab Units 01/13/18  1315   INR  0 87       Imaging: I have personally reviewed pertinent reports  No results found  EKG, Pathology, and Other Studies Reviewed on Admission:   · EKG:     Allscripts / Epic Records Reviewed: Yes     ** Please Note: This note has been constructed using a voice recognition system   **

## 2018-01-13 NOTE — ED NOTES
Patient returned from testing  Mask on patient  Patient repeated peak flow post neb, results 140  Expiratory wheezing noted and patient reports not feeling any better after neb tx  Aware I will let the PA know  Call bell within reach  Tech drawing second blood culture       Leroy Vidal RN  01/13/18 0794

## 2018-01-13 NOTE — ED NOTES
Patient to x-ray and has mask on     [place] Sandrine Green, [facility]  01/13/18 7287

## 2018-01-13 NOTE — ED NOTES
Patient refusing to do peak flow and medications until additional lab work is drawn       Raj Lenz, JOSÉ MIGUEL  01/13/18 8841

## 2018-01-13 NOTE — ED NOTES
JOSÉ MIGUEL Mayorga notifying floor patient is coming  Tech to take patient to floor with mask       Jordy Us RN  01/13/18 9784

## 2018-01-13 NOTE — ED NOTES
Spoke with Shila Sunshine in lab, aware Flu label is not printing, lab to send label       Juju Schwab RN  01/13/18 8774

## 2018-01-13 NOTE — ASSESSMENT & PLAN NOTE
Likely secondary to acute  COPD exacerbation likely secondary to influenza a infection  Continue Tamiflu  Continue home medication  Discharged home

## 2018-01-13 NOTE — ASSESSMENT & PLAN NOTE
Likely secondary to acute  COPD exacerbation in the setting of community-acquired pneumonia vs acute bronchitis   Follow chest x-ray  Continue IV antibiotic  Scheduled nebulizer  Incentive spirometry

## 2018-01-13 NOTE — ED NOTES
[facility] SYSTEM obtaining EKG     Maria Esther Storey, [address] [facility]  01/13/18 6991

## 2018-01-13 NOTE — RESPIRATORY THERAPY NOTE
RT Protocol Note  Kris Gonzalez 75 y o  female MRN: [id number]  Unit/Bed#: -01 Encounter: [id number]    Assessment    Principal Problem:    Acute respiratory distress  Active Problems:    Essential hypertension    Hypothyroidism    COPD with acute exacerbation ([facility] )      Home Pulmonary Medications:  Albuterol and Breo Ellipta    Past Medical History:   Diagnosis Date    COPD (chronic obstructive pulmonary disease) ([facility] )     Depression     Hypertension     Hypothyroid      Social History     Social History    Marital status: /Civil Union     Spouse name: N/A    Number of children: N/A    Years of education: N/A     Social History Main Topics    Smoking status: Former Smoker     Packs/day: 1 00     Years: 15 00     Types: Cigarettes     Quit date: 1/1/2012    Smokeless tobacco: Never Used    Alcohol use No    Drug use: No    Sexual activity: Not Asked     Other Topics Concern    None     Social History Narrative    None       Subjective      Pt assessed per RCP, pt states she takes [employer] and Albuterol nebulizers at home  Pt received heart neb in ED  Will order TID + PRN tx's per Respiratory protocol and home regime  Objective      Physical Exam:   Assessment Type: Assess only  General Appearance: Awake, Alert  Respiratory Pattern: Normal  Chest Assessment: Chest expansion symmetrical  Bilateral Breath Sounds: Diminished  Cough: Non-productive  O2 Device: Room Air    Vitals:  Blood pressure 136/68, pulse (!) 128, temperature 98 2 °F (36 8 °C), temperature source Oral, resp  rate 18, height 5' 2" (1 575 m), weight 86 1 kg (189 lb 12 8 oz), SpO2 96 %, not currently breastfeeding  Imaging and other studies: I have personally reviewed pertinent reports  O2 Device: Room Air     Plan    Respiratory Plan: Home Bronchodilator Patient pathway        Resp Comments: Pt assessed per RCP, pt states she takes Breo Ellipta and Albuterol nebulizers at home   Pt received heart neb in ED  Will order TID + PRN tx's per Respiratory protocol and home regime

## 2018-01-13 NOTE — ED NOTES
Patient receiving heart neb tx  Aware she is being admitted    Internal Medicine Dr  At bedside     Florencia Blevins, RN  01/13/18 1778

## 2018-01-13 NOTE — ED NOTES
PA Charla Dubin aware post neb peak flow was 140 and patient reports not feeling any better       Maria Esther Storey RN  01/13/18 2726

## 2018-01-13 NOTE — ED NOTES
Patient reports being treated for bronchitis x 5 days, taking prednisone x 2 days and reports sob at 3 am   Mask noted on patient upon arrival to room 26       David Larson RN  01/13/18 5553

## 2018-01-13 NOTE — ED PROVIDER NOTES
History  Chief Complaint   Patient presents with    Shortness of Breath     Pt presents to ED due to intermittent episode of inability to catch breath w/ exertion  Pt saw PCP Tuesday for cough, wheeze, SOB, headache, joint pain and rx antibiotic and steroids  Home inhaler ineffective  PMH: COPD  HPI    Prior to Admission Medications   Prescriptions Last Dose Informant Patient Reported? Taking? amLODIPine (NORVASC) 10 mg tablet 1/13/2018 at 800 Self Yes Yes   Sig: Take 10 mg by mouth daily   azithromycin (ZITHROMAX) 500 MG tablet 1/13/2018 at 800 Self Yes Yes   Sig: Take 500 mg by mouth daily   cholecalciferol (VITAMIN D3) 1,000 units tablet 1/1/2018 at 800  Yes Yes   Sig: Take 1,000 Units by mouth once a week   citalopram (CeleXA) 20 mg tablet 1/13/2018 at 800 Self Yes Yes   Sig: Take 20 mg by mouth daily     fluticasone furoate-vilanterol (BREO ELLIPTA) 200-25 MCG/INH inhaler 1/13/2018 at 800 Self Yes Yes   Sig: Inhale 1 puff daily Dose unknown   levothyroxine 88 mcg tablet 1/13/2018 at 0800 Self Yes Yes   Sig: Take 88 mcg by mouth daily   predniSONE 20 mg tablet 1/13/2018 at 0800 Self Yes Yes   Sig: Take 40 mg by mouth daily      Facility-Administered Medications: None       Past Medical History:   Diagnosis Date    COPD (chronic obstructive pulmonary disease) ([facility] [address] )     Depression     Hypertension     Hypothyroid        Past Surgical History:   Procedure Laterality Date    NO PAST SURGERIES         History reviewed  No pertinent family history  I have reviewed and agree with the history as documented      Social History   Substance Use Topics    Smoking status: Former Smoker     Packs/day: 1 00     Years: 15 00     Types: Cigarettes     Quit date: 1/1/2012    Smokeless tobacco: Never Used    Alcohol use No        Review of Systems    Physical Exam  ED Triage Vitals [01/13/18 1153]   Temperature Pulse Respirations Blood Pressure SpO2   98 2 °F (36 8 °C) 91 18 159/66 94 %      Temp Source Heart Rate Source Patient Position - Orthostatic VS BP Location FiO2 (%)   Oral Monitor Sitting Left arm --      Pain Score       7           Orthostatic Vital Signs  Vitals:    01/13/18 1312 01/13/18 1415 01/13/18 1523 01/13/18 1620   BP: 139/72 145/84 152/82 136/68   Pulse: 88 99 102 (!) 128   Patient Position - Orthostatic VS: Sitting Sitting Sitting Lying       Physical Exam    ED Medications  Medications   lactated ringers infusion (not administered)   methylPREDNISolone sodium succinate (Solu-MEDROL) injection 40 mg (not administered)   enoxaparin (LOVENOX) subcutaneous injection 40 mg (not administered)   budesonide (PULMICORT) inhalation solution 0 5 mg (not administered)   azithromycin (ZITHROMAX) 500 mg in sodium chloride 0 9% 250mL IVPB 500 mg (not administered)   cefTRIAXone (ROCEPHIN) 1,000 mg in dextrose 5 % 50 mL IVPB (not administered)   ipratropium (ATROVENT) 0 02 % inhalation solution 0 5 mg (not administered)   albuterol inhalation solution 2 5 mg (not administered)   levalbuterol (XOPENEX) inhalation solution 1 25 mg (not administered)   sodium chloride 0 9 % bolus 1,000 mL (0 mL Intravenous Stopped 1/13/18 1516)   albuterol inhalation solution 5 mg (5 mg Nebulization Given 1/13/18 1328)   ipratropium (ATROVENT) 0 02 % inhalation solution 0 5 mg (0 5 mg Nebulization Given 1/13/18 1328)   methylPREDNISolone sodium succinate (Solu-MEDROL) injection 80 mg (80 mg Intravenous Given 1/13/18 1329)   albuterol inhalation solution 10 mg (10 mg Nebulization Given 1/13/18 1432)   ipratropium (ATROVENT) 0 02 % inhalation solution 1 mg (1 mg Nebulization Given 1/13/18 1432)   ceftriaxone (ROCEPHIN) 1 g/50 mL in dextrose IVPB (0 mg Intravenous Stopped 1/13/18 1505)   azithromycin (ZITHROMAX) 500 mg in sodium chloride 0 9% 250mL IVPB 500 mg (500 mg Intravenous New Bag 1/13/18 1518)   oseltamivir (TAMIFLU) capsule 75 mg (75 mg Oral Given 1/13/18 1517)       Diagnostic Studies  Results Reviewed     Procedure Component Value Units Date/Time    Blood culture #2 [[id number]] Collected:  01/13/18 1413    Lab Status: In process Specimen:  Blood from Arm, Right Updated:  01/13/18 1423    Lactic acid, plasma [[id number]]  (Normal) Collected:  01/13/18 1330    Lab Status:  Final result Specimen:  Blood from Arm, Right Updated:  01/13/18 1402     LACTIC ACID 1 1 mmol/L     Narrative:         Result may be elevated if tourniquet was used during collection  Magnesium [[id number]]  (Normal) Collected:  01/13/18 1315    Lab Status:  Final result Specimen:  Blood from Arm, Left Updated:  01/13/18 1357     Magnesium 2 3 mg/dL     TSH [[id number]]  (Normal) Collected:  01/13/18 1315    Lab Status:  Final result Specimen:  Blood from Arm, Left Updated:  01/13/18 1354     TSH 3RD GENERATON 0 622 uIU/mL     Narrative:         Patients undergoing fluorescein dye angiography may retain small amounts of fluorescein in the body for 48-72 hours post procedure  Samples containing fluorescein can produce falsely depressed TSH values  If the patient had this procedure,a specimen should be resubmitted post fluorescein clearance            The recommended reference ranges for TSH during pregnancy are as follows:  First trimester 0 1 to 2 5 uIU/mL  Second trimester  0 2 to 3 0 uIU/mL  Third trimester 0 3 to 3 0 uIU/m      Comprehensive metabolic panel [[id number]]  (Abnormal) Collected:  01/13/18 1311    Lab Status:  Final result Specimen:  Blood from Arm, Left Updated:  01/13/18 1353     Sodium 139 mmol/L      Potassium 4 0 mmol/L      Chloride 104 mmol/L      CO2 24 mmol/L      Anion Gap 11 mmol/L      BUN 18 mg/dL      Creatinine 0 58 (L) mg/dL      Glucose 139 mg/dL      Calcium 9 3 mg/dL      AST 30 U/L      ALT 28 U/L      Alkaline Phosphatase 74 U/L      Total Protein 7 6 g/dL      Albumin 3 7 g/dL      Total Bilirubin 0 30 mg/dL      eGFR 95 ml/min/1 73sq m     Narrative:         National Kidney Disease Education Program recommendations are as follows:  GFR calculation is accurate only with a steady state creatinine  Chronic Kidney disease less than 60 ml/min/1 73 sq  meters  Kidney failure less than 15 ml/min/1 73 sq  meters  Lipase [[id number]]  (Normal) Collected:  01/13/18 1311    Lab Status:  Final result Specimen:  Blood from Arm, Left Updated:  01/13/18 1348     Lipase 140 u/L     Influenza A/B and RSV by PCR (indicated for patients >2 mo of age) [[id number]] Collected:  01/13/18 1342    Lab Status: In process Specimen:  Nasopharyngeal from Nasopharyngeal Swab Updated:  01/13/18 1347    Protime-INR [[id number]]  (Normal) Collected:  01/13/18 1315    Lab Status:  Final result Specimen:  Blood from Arm, Left Updated:  01/13/18 1336     Protime 12 1 seconds      INR 0 87    APTT [[id number]]  (Normal) Collected:  01/13/18 1315    Lab Status:  Final result Specimen:  Blood from Arm, Left Updated:  01/13/18 1336     PTT 26 seconds     Narrative: Therapeutic Heparin Range = 60-90 seconds    Blood culture #1 [[id number]] Collected:  01/13/18 1330    Lab Status:   In process Specimen:  Blood from Arm, Right Updated:  01/13/18 1334    CBC and differential [[id number]]  (Abnormal) Collected:  01/13/18 1311    Lab Status:  Final result Specimen:  Blood from Arm, Left Updated:  01/13/18 1329     WBC 3 93 (L) Thousand/uL      RBC 4 51 Million/uL      Hemoglobin 13 6 g/dL      Hematocrit 41 2 %      MCV 91 fL      MCH 30 2 pg      MCHC 33 0 g/dL      RDW 13 4 %      MPV 8 9 fL      Platelets 215 Thousands/uL      Neutrophils Relative 84 (H) %      Lymphocytes Relative 9 (L) %      Monocytes Relative 7 %      Eosinophils Relative 0 %      Basophils Relative 0 %      Neutrophils Absolute 3 33 Thousands/µL      Lymphocytes Absolute 0 34 (L) Thousands/µL      Monocytes Absolute 0 26 Thousand/µL      Eosinophils Absolute 0 00 Thousand/µL      Basophils Absolute 0 00 Thousands/µL                  XR chest 2 views   ED Interpretation by Genny Fisher PA-C (01/13 0325) Pneumonia right base                  Procedures  ECG 12 Lead Documentation  Date/Time: 1/13/2018 1:45 PM  Performed by: Inez Pruitt  Authorized by: Inez Pruitt     Indications / Diagnosis:  Dyspnea  ECG reviewed by me, the ED Provider: yes    Patient location:  ED  Previous ECG:     Previous ECG:  Compared to current    Comparison ECG info:  3/21/1997    Similarity:  No change    Comparison to cardiac monitor: Yes    Rate:     ECG rate assessment: normal    Rhythm:     Rhythm: sinus rhythm    Ectopy:     Ectopy: none    QRS:     QRS axis:  Normal    QRS intervals:  Normal  Conduction:     Conduction: normal    ST segments:     ST segments:  Normal  T waves:     T waves: normal      CriticalCare Time  Performed by: Inez Pruitt  Authorized by: Inez Pruitt     Critical care provider statement:     Critical care start time:  1/13/2018 3:15 PM    Critical care end time:  1/13/2018 3:30 PM    Critical care was necessary to treat or prevent imminent or life-threatening deterioration of the following conditions:  Respiratory failure    Critical care was time spent personally by me on the following activities:  Discussions with consultants, evaluation of patient's response to treatment, re-evaluation of patient's condition, ordering and review of laboratory studies and ordering and review of radiographic studies    I assumed direction of critical care for this patient from another provider in my specialty: no             Phone Contacts  ED Phone Contact    ED Course  ED Course                                MDM  Number of Diagnoses or Management Options  COPD exacerbation ([facility] [address] ): new and requires workup  Pneumonia: new and requires workup     Amount and/or Complexity of Data Reviewed  Clinical lab tests: ordered and reviewed  Tests in the radiology section of CPT®: ordered and reviewed  Tests in the medicine section of CPT®: ordered and reviewed    Risk of Complications, Morbidity, and/or Mortality  Presenting problems: high  Diagnostic procedures: high  Management options: high  General comments: Presents emergency room for viral-like symptoms on and off for the past 4 days  She was seen by her physician and started on Zithromax  She had a recheck evaluation in 2 days later was given a prescription for prednisone  She has been using her nebulizers at home as well as her inhaled steroid without relief  She presents complaining of shortness of breath that is worse with exertion  She also complains of a productive cough with yellow sputum  Laboratory studies as well as an x-ray were obtained  The x-ray demonstrates a right lower lobe pneumonia  The patient was treated with the Rocephin and Zithromax as it is a community-acquired pneumonia  Her laboratory studies were reviewed  She was admitted to the hospital for IV antibiotics for 23 hour stay  She was given a heart neb in the emergency room in her symptoms improved  Patient Progress  Patient progress: stable    The patient presented with a condition in which there was a high probability of imminent or life-threatening deterioration, and critical care services (excluding separately billable procedures) totalled 30-74 minutes          Disposition  Final diagnoses:   Pneumonia - Community-acquired/right lower lobe   COPD exacerbation ([facility] [address] )     Time reflects when diagnosis was documented in both MDM as applicable and the Disposition within this note     Time User Action Codes Description Comment    1/13/2018  3:14 PM Agustín Adkins Add [J18 9] Pneumonia     1/13/2018  3:15 PM Agustín Adkins Modify [J18 9] Pneumonia Community-acquired/right lower lobe    1/13/2018  3:15 PM Agustín Salinas [J44 1] COPD exacerbation [facility])       ED Disposition     ED Disposition Condition Comment    Admit  Case was discussed with  Dr Marylu Enriquez and the patient's admission status was agreed to be Admission Status: observation status to the service of   Katherine Encinas   Follow-up Information    None       Current Discharge Medication List      CONTINUE these medications which have NOT CHANGED    Details   amLODIPine (NORVASC) 10 mg tablet Take 10 mg by mouth daily      azithromycin (ZITHROMAX) 500 MG tablet Take 500 mg by mouth daily      cholecalciferol (VITAMIN D3) 1,000 units tablet Take 1,000 Units by mouth once a week      citalopram (CeleXA) 20 mg tablet Take 20 mg by mouth daily        fluticasone furoate-vilanterol (BREO ELLIPTA) 200-25 MCG/INH inhaler Inhale 1 puff daily Dose unknown      levothyroxine 88 mcg tablet Take 88 mcg by mouth daily      predniSONE 20 mg tablet Take 40 mg by mouth daily           No discharge procedures on file      ED Provider  Electronically Signed by           Juju Campos PA-C  01/13/18 7629

## 2018-01-13 NOTE — ED NOTES
Attempted IV access x 2 attempts without success to initiate FN  Call bell within reach, bed in low position, patient on cont   Pulse ox     Taylor Sepulveda RN  01/13/18 0756

## 2018-01-14 VITALS
SYSTOLIC BLOOD PRESSURE: 168 MMHG | RESPIRATION RATE: 18 BRPM | OXYGEN SATURATION: 93 % | WEIGHT: 189.8 LBS | HEART RATE: 97 BPM | HEIGHT: 62 IN | TEMPERATURE: 97.4 F | BODY MASS INDEX: 34.93 KG/M2 | DIASTOLIC BLOOD PRESSURE: 75 MMHG

## 2018-01-14 VITALS
OXYGEN SATURATION: 96 % | WEIGHT: 186 LBS | SYSTOLIC BLOOD PRESSURE: 130 MMHG | BODY MASS INDEX: 34.23 KG/M2 | HEIGHT: 62 IN | TEMPERATURE: 98.2 F | HEART RATE: 77 BPM | DIASTOLIC BLOOD PRESSURE: 72 MMHG

## 2018-01-14 PROBLEM — G47.00 INSOMNIA: Status: ACTIVE | Noted: 2018-01-14

## 2018-01-14 PROBLEM — E87.6 HYPOKALEMIA: Status: ACTIVE | Noted: 2018-01-14

## 2018-01-14 LAB
ANION GAP SERPL CALCULATED.3IONS-SCNC: 12 MMOL/L (ref 4–13)
BUN SERPL-MCNC: 8 MG/DL (ref 5–25)
CALCIUM SERPL-MCNC: 9.2 MG/DL (ref 8.3–10.1)
CHLORIDE SERPL-SCNC: 104 MMOL/L (ref 100–108)
CO2 SERPL-SCNC: 28 MMOL/L (ref 21–32)
CREAT SERPL-MCNC: 0.7 MG/DL (ref 0.6–1.3)
ERYTHROCYTE [DISTWIDTH] IN BLOOD BY AUTOMATED COUNT: 13.5 % (ref 11.6–15.1)
GFR SERPL CREATININE-BSD FRML MDRD: 89 ML/MIN/1.73SQ M
GLUCOSE SERPL-MCNC: 183 MG/DL (ref 65–140)
HCT VFR BLD AUTO: 41.9 % (ref 34.8–46.1)
HGB BLD-MCNC: 13.7 G/DL (ref 11.5–15.4)
MCH RBC QN AUTO: 29.8 PG (ref 26.8–34.3)
MCHC RBC AUTO-ENTMCNC: 32.7 G/DL (ref 31.4–37.4)
MCV RBC AUTO: 91 FL (ref 82–98)
PLATELET # BLD AUTO: 268 THOUSANDS/UL (ref 149–390)
PMV BLD AUTO: 8.9 FL (ref 8.9–12.7)
POTASSIUM SERPL-SCNC: 3.3 MMOL/L (ref 3.5–5.3)
RBC # BLD AUTO: 4.6 MILLION/UL (ref 3.81–5.12)
SODIUM SERPL-SCNC: 144 MMOL/L (ref 136–145)
WBC # BLD AUTO: 5.37 THOUSAND/UL (ref 4.31–10.16)

## 2018-01-14 PROCEDURE — 80048 BASIC METABOLIC PNL TOTAL CA: CPT | Performed by: FAMILY MEDICINE

## 2018-01-14 PROCEDURE — 85027 COMPLETE CBC AUTOMATED: CPT | Performed by: FAMILY MEDICINE

## 2018-01-14 RX ORDER — POTASSIUM CHLORIDE 20 MEQ/1
40 TABLET, EXTENDED RELEASE ORAL ONCE
Status: COMPLETED | OUTPATIENT
Start: 2018-01-14 | End: 2018-01-14

## 2018-01-14 RX ORDER — LANOLIN ALCOHOL/MO/W.PET/CERES
6 CREAM (GRAM) TOPICAL
Status: DISCONTINUED | OUTPATIENT
Start: 2018-01-14 | End: 2018-01-14 | Stop reason: HOSPADM

## 2018-01-14 RX ORDER — LORAZEPAM 0.5 MG/1
0.5 TABLET ORAL
Qty: 30 TABLET | Refills: 0 | Status: SHIPPED | OUTPATIENT
Start: 2018-01-14 | End: 2018-01-24

## 2018-01-14 RX ORDER — DIPHENHYDRAMINE HCL 25 MG
25 TABLET ORAL ONCE
Status: COMPLETED | OUTPATIENT
Start: 2018-01-14 | End: 2018-01-14

## 2018-01-14 RX ORDER — HYDROCODONE POLISTIREX AND CHLORPHENIRAMINE POLISTIREX 10; 8 MG/5ML; MG/5ML
5 SUSPENSION, EXTENDED RELEASE ORAL EVERY 12 HOURS PRN
Status: DISCONTINUED | OUTPATIENT
Start: 2018-01-14 | End: 2018-01-14 | Stop reason: HOSPADM

## 2018-01-14 RX ORDER — ALPRAZOLAM 1 MG/1
1 TABLET, EXTENDED RELEASE ORAL EVERY MORNING
Qty: 10 TABLET | Refills: 0 | Status: SHIPPED | OUTPATIENT
Start: 2018-01-14 | End: 2018-01-24

## 2018-01-14 RX ORDER — OSELTAMIVIR PHOSPHATE 75 MG/1
75 CAPSULE ORAL EVERY 12 HOURS SCHEDULED
Qty: 10 CAPSULE | Refills: 0 | Status: SHIPPED | OUTPATIENT
Start: 2018-01-14 | End: 2018-01-19

## 2018-01-14 RX ADMIN — SODIUM CHLORIDE, POTASSIUM CHLORIDE, SODIUM LACTATE AND CALCIUM CHLORIDE 50 ML/HR: 600; 310; 30; 20 INJECTION, SOLUTION INTRAVENOUS at 05:41

## 2018-01-14 RX ADMIN — DIPHENHYDRAMINE HCL 25 MG: 25 TABLET ORAL at 01:47

## 2018-01-14 RX ADMIN — POTASSIUM CHLORIDE 40 MEQ: 1500 TABLET, EXTENDED RELEASE ORAL at 13:15

## 2018-01-14 RX ADMIN — OSELTAMIVIR PHOSPHATE 75 MG: 75 CAPSULE ORAL at 09:06

## 2018-01-14 RX ADMIN — ENOXAPARIN SODIUM 40 MG: 40 INJECTION SUBCUTANEOUS at 09:06

## 2018-01-14 NOTE — DISCHARGE SUMMARY
Discharge- Malia Billings 1950, 75 y o  female MRN: [id number]    Unit/Bed#: -01 Encounter: [id number]    Primary Care Provider: Ana María Joseph MD   Date and time admitted to hospital: 1/13/2018 12:11 PM        * Acute respiratory distress   Assessment & Plan    Likely secondary to acute  COPD exacerbation likely secondary to influenza a infection  Continue Tamiflu  Continue home medication  Discharged home          COPD with acute exacerbation ([address] )   Assessment & Plan    Improved   Likely secondary to acute  COPD exacerbation likely secondary to influenza a infection  Continue Tamiflu  Continue home medication  Discharged home        Hypokalemia   Assessment & Plan    Replace potassium before discharged          Hypothyroidism   Assessment & Plan    Continue levothyroxine          Essential hypertension   Assessment & Plan    Stable  Continue home medication            Consultations During Hospital Stay:  ·     Procedures Performed:     ·     Significant Findings / Test Results: Incidental Findings:   ·     Test Results Pending at Discharge (will require follow up):   ·      Outpatient Tests Requested:  ·     Complications:  none    Hospital Course:     Malia Billings is a 75 y o  female patient who originally presented to the hospital on 1/13/2018 due to acute respiratory failure secondary to acute COPD exacerbation  Patient was admitted for respiratory support we has been improving for the last 24 hours, patient was found to have influenza a positive reason why she will be discharged on p o  Treatment  Condition at Discharge: stable     Discharge Day Visit / Exam:     Subjective:  I am better  Denied worsening of SOB     Vitals: Blood Pressure: 168/75 (01/14/18 0716)  Pulse: 97 (01/14/18 0716)  Temperature: (!) 97 4 °F (36 3 °C) (01/14/18 0716)  Temp Source: Oral (01/14/18 0716)  Respirations: 18 (01/14/18 0716)  Height: 5' 2" (157 5 cm) (01/13/18 1144)  Weight - Scale: 86 1 kg (189 lb 12 8 oz) (01/13/18 1144)  SpO2: 93 % (01/14/18 0716)  Exam:   Physical Exam   Constitutional: She is oriented to person, place, and time  No distress  Neck: Normal range of motion  No JVD present  No tracheal deviation present  No thyromegaly present  Cardiovascular: Normal rate and normal heart sounds  Exam reveals no gallop and no friction rub  No murmur heard  Pulmonary/Chest: Effort normal  No respiratory distress  She has no wheezes  She has no rales  She exhibits no tenderness  Abdominal: Soft  She exhibits no distension and no mass  There is no tenderness  There is no rebound and no guarding  Lymphadenopathy:     She has no cervical adenopathy  Neurological: She is alert and oriented to person, place, and time  She has normal reflexes  She displays normal reflexes  No cranial nerve deficit  She exhibits normal muscle tone  Coordination normal    Skin: Skin is warm  She is not diaphoretic  Psychiatric: She has a normal mood and affect  Discussion with Family:     Discharge instructions/Information to patient and family:   See after visit summary for information provided to patient and family  Provisions for Follow-Up Care:  See after visit summary for information related to follow-up care and any pertinent home health orders  Disposition:     Home    For Discharges to [facility]:   · Not Applicable to this Patient - Not Applicable to this Patient    Planned Readmission:      Discharge Statement:  I spent 30 minutes discharging the patient  This time was spent on the day of discharge  I had direct contact with the patient on the day of discharge  Greater than 50% of the total time was spent examining patient, answering all patient questions, arranging and discussing plan of care with patient as well as directly providing post-discharge instructions  Additional time then spent on discharge activities      Discharge Medications:  See after visit summary for reconciled discharge medications provided to patient and family        ** Please Note: This note has been constructed using a voice recognition system **

## 2018-01-14 NOTE — PLAN OF CARE
Nutrition/Hydration-ADULT     Nutrient/Hydration intake appropriate for improving, restoring or maintaining nutritional needs Progressing

## 2018-01-15 LAB
ATRIAL RATE: 95 BPM
P AXIS: 77 DEGREES
PR INTERVAL: 194 MS
QRS AXIS: 26 DEGREES
QRSD INTERVAL: 88 MS
QT INTERVAL: 370 MS
QTC INTERVAL: 464 MS
T WAVE AXIS: 71 DEGREES
VENTRICULAR RATE: 95 BPM

## 2018-01-15 NOTE — CASE MANAGEMENT
Initial Clinical Review    Admission: Date/Time/Statement: 1/13/18 @ 1616 INPATIENT, changed from observation ordered at [address] [place] [address] This Encounter   Procedures    Place in Observation (expected length of stay for this patient is less than two midnights)     Standing Status:   Standing     Number of Occurrences:   1     Order Specific Question:   Admitting Physician     Answer:   Marixa Villalba [[id number]]     Order Specific Question:   Level of Care     Answer:   Med Surg [16]    Inpatient Admission     Standing Status:   Standing     Number of Occurrences:   1     Order Specific Question:   Admitting Physician     Answer:   Marixa Villalba [id number]     Order Specific Question:   Level of Care     Answer:   Med Surg [16]     Order Specific Question:   Estimated length of stay     Answer:   More than 2 Midnights     Order Specific Question:   Certification     Answer:   I certify that inpatient services are medically necessary for this patient for a duration of greater than two midnights  See H&P and MD Progress Notes for additional information about the patient's course of treatment  ED: Date/Time/Mode of Arrival:   ED Arrival Information     Expected Arrival Acuity Means of Arrival Escorted By Service Admission Type    - 1/13/2018 11:38 Urgent Walk-In Self General Medicine Urgent    Arrival Complaint    SHORT OF BREATH/ HX CPOD/ COUGH          Chief Complaint:   Chief Complaint   Patient presents with    Shortness of Breath     Pt presents to ED due to intermittent episode of inability to catch breath w/ exertion  Pt saw PCP Tuesday for cough, wheeze, SOB, headache, joint pain and rx antibiotic and steroids  Home inhaler ineffective  PMH: COPD  History of Illness: 75 y o  female significant past medical history of HTN, hypothyroidism, COPD mild who comes today complaining short of breath with has been getting worse for the last 24 hours    Patient report short of breath that started 1 weeks ago but today she was not able to even walk to a difficulty breathing  Patient reports she was seen by PCP couple days ago because she was constantly coughing with wheezing short of breath headache joint pain reason why she was started on p  o  antibiotic steroid without relief of symptoms    ED Vital Signs:   ED Triage Vitals [01/13/18 1153]   Temperature Pulse Respirations Blood Pressure SpO2   98 2 °F (36 8 °C) 91 18 159/66 94 %      Temp Source Heart Rate Source Patient Position - Orthostatic VS BP Location FiO2 (%)   Oral Monitor Sitting Left arm --      Pain Score       7        Wt Readings from Last 1 Encounters:   01/13/18 86 1 kg (189 lb 12 8 oz)       Vital Signs (abnormal):  Maximum pulse 102    Abnormal Labs/Diagnostic Test Results:   INFLU A detected     Wbc 3 93    ED Treatment: blood cultutres  Medication Administration from 01/13/2018 1138 to 01/13/2018 1548       Date/Time Order Dose Route Action Action by Comments     01/13/2018 1516 sodium chloride 0 9 % bolus 1,000 mL 0 mL Intravenous Stopped Taylor Sepulveda RN      01/13/2018 1317 sodium chloride 0 9 % bolus 1,000 mL 1,000 mL Intravenous [address] 37 Taylor Sepulveda [facility]      01/13/2018 1328 albuterol inhalation solution 5 mg 5 mg Nebulization Given Omar Green RN pre peak flow 140     01/13/2018 1328 ipratropium (ATROVENT) 0 02 % inhalation solution 0 5 mg 0 5 mg Nebulization Given Taylor Sepulveda RN      01/13/2018 1329 methylPREDNISolone sodium succinate (Solu-MEDROL) injection 80 mg 80 mg Intravenous Given Taylor Sepulveda RN      01/13/2018 1432 albuterol inhalation solution 10 mg 10 mg Nebulization Given Maurizio Markham RT      01/13/2018 1432 ipratropium (ATROVENT) 0 02 % inhalation solution 1 mg 1 mg Nebulization Given RT Rajat      01/13/2018 1505 ceftriaxone (ROCEPHIN) 1 g/50 mL in dextrose IVPB 0 mg Intravenous Stopped Taylor Sepulveda RN      01/13/2018 1430 ceftriaxone (ROCEPHIN) 1 g/50 mL in dextrose IVPB 1,000 mg Intravenous [address] Evonne Huertas, [address] [facility]      01/13/2018 1518 azithromycin (ZITHROMAX) 500 mg in sodium chloride 0 9% 250mL IVPB 500 mg 500 mg Intravenous [place] Pako Blanchard RN      01/13/2018 1517 oseltamivir (TAMIFLU) capsule 75 mg 75 mg Oral Given Evonne Huertas RN           Past Medical/Surgical History: Active Ambulatory Problems     Diagnosis Date Noted    No Active Ambulatory Problems     Resolved Ambulatory Problems     Diagnosis Date Noted    No Resolved Ambulatory Problems     Past Medical History:   Diagnosis Date    COPD (chronic obstructive pulmonary disease) ([facility] [address] )     Depression     Hypertension     Hypothyroid        Admitting Diagnosis: Pneumonia [J18 9]  COPD exacerbation (HCC) [J44 1]  Short of breath on exertion [R06 02]    Age/Sex: 75 y o  female    Assessment/Plan:   Acute respiratory distress   Assessment & Plan     Likely secondary to acute  COPD exacerbation in the setting of community-acquired pneumonia vs acute bronchitis   Follow chest x-ray  Continue IV antibiotic  Scheduled nebulizer  Incentive spirometry          COPD with acute exacerbation (HCC)   Assessment & Plan     Previous history of mild  COPD on BREO-ELLIPTA  Cont respiratory therapy   Follows x-ray       Hypothyroidism   Assessment & Plan     Continue levothyroxine          Essential hypertension   Assessment & Plan     Stable  Continue home medication         Admission Orders:  1/13/2018  1516 OBSERVATION and changed @ 1617 INPATIENT   Scheduled Meds: azithromycin 500 iv daily  pulmicort q 12h  Rocephin 1 gm iv daily  lovenox 40 sq daily  Neb atrovent/xoepnex tid  Po medication:  Melatonin  Oseltamivir  Continuous Infusions: ivf @ 50 cc/h  PRN Meds: unison - used x 1

## 2018-01-18 LAB — BACTERIA BLD CULT: NORMAL

## 2018-01-19 LAB — BACTERIA BLD CULT: NORMAL

## 2018-01-22 VITALS
HEIGHT: 62 IN | HEART RATE: 94 BPM | OXYGEN SATURATION: 97 % | DIASTOLIC BLOOD PRESSURE: 74 MMHG | BODY MASS INDEX: 35.59 KG/M2 | WEIGHT: 193.38 LBS | TEMPERATURE: 98.1 F | SYSTOLIC BLOOD PRESSURE: 130 MMHG

## 2018-01-23 VITALS
HEART RATE: 112 BPM | SYSTOLIC BLOOD PRESSURE: 118 MMHG | HEIGHT: 62 IN | WEIGHT: 192.38 LBS | DIASTOLIC BLOOD PRESSURE: 76 MMHG | TEMPERATURE: 98.2 F | OXYGEN SATURATION: 95 % | BODY MASS INDEX: 35.4 KG/M2

## 2018-01-25 ENCOUNTER — PATIENT OUTREACH (OUTPATIENT)
Dept: FAMILY MEDICINE CLINIC | Facility: CLINIC | Age: 68
End: 2018-01-25

## 2018-02-02 ENCOUNTER — PATIENT OUTREACH (OUTPATIENT)
Dept: FAMILY MEDICINE CLINIC | Facility: CLINIC | Age: 68
End: 2018-02-02

## 2018-02-13 ENCOUNTER — PATIENT OUTREACH (OUTPATIENT)
Dept: FAMILY MEDICINE CLINIC | Facility: CLINIC | Age: 68
End: 2018-02-13

## 2018-02-13 NOTE — PROGRESS NOTES
Patient part of [facility]PCI program  Neetu attempts made to contact patient  Voicemail left x3 with no return call  Will continue program through frequent chart checks

## 2018-04-02 DIAGNOSIS — J30.9 ALLERGIC RHINITIS, UNSPECIFIED CHRONICITY, UNSPECIFIED SEASONALITY, UNSPECIFIED TRIGGER: Primary | ICD-10-CM

## 2018-04-02 RX ORDER — FLUTICASONE PROPIONATE 50 MCG
SPRAY, SUSPENSION (ML) NASAL
Qty: 1 BOTTLE | Refills: 0 | Status: SHIPPED | OUTPATIENT
Start: 2018-04-02

## 2018-04-16 ENCOUNTER — PATIENT OUTREACH (OUTPATIENT)
Dept: FAMILY MEDICINE CLINIC | Facility: CLINIC | Age: 68
End: 2018-04-16

## 2018-04-29 DIAGNOSIS — I10 HYPERTENSION, UNSPECIFIED TYPE: Primary | ICD-10-CM

## 2018-04-30 RX ORDER — AMLODIPINE BESYLATE 10 MG/1
TABLET ORAL
Qty: 90 TABLET | Refills: 1 | Status: SHIPPED | OUTPATIENT
Start: 2018-04-30

## 2018-05-29 DIAGNOSIS — J44.9 COPD, MILD (HCC): Primary | ICD-10-CM

## 2018-05-29 RX ORDER — FLUTICASONE FUROATE AND VILANTEROL TRIFENATATE 100; 25 UG/1; UG/1
POWDER RESPIRATORY (INHALATION)
Qty: 1 EACH | Refills: 1 | Status: SHIPPED | OUTPATIENT
Start: 2018-05-29 | End: 2018-08-06 | Stop reason: SDUPTHER

## 2018-07-26 DIAGNOSIS — Z91.09 ENVIRONMENTAL ALLERGIES: Primary | ICD-10-CM

## 2018-07-26 RX ORDER — MONTELUKAST SODIUM 10 MG/1
TABLET ORAL
Qty: 90 TABLET | Refills: 1 | Status: SHIPPED | OUTPATIENT
Start: 2018-07-26

## 2018-08-06 DIAGNOSIS — J44.9 COPD, MILD (HCC): ICD-10-CM

## 2018-08-07 RX ORDER — FLUTICASONE FUROATE AND VILANTEROL TRIFENATATE 100; 25 UG/1; UG/1
POWDER RESPIRATORY (INHALATION)
Qty: 1 INHALER | Refills: 1 | Status: SHIPPED | OUTPATIENT
Start: 2018-08-07 | End: 2019-01-07 | Stop reason: SDUPTHER

## 2018-09-18 DIAGNOSIS — J44.9 COPD, MILD (HCC): ICD-10-CM

## 2018-09-18 RX ORDER — FLUTICASONE FUROATE AND VILANTEROL TRIFENATATE 100; 25 UG/1; UG/1
POWDER RESPIRATORY (INHALATION)
Qty: 1 INHALER | Refills: 2 | Status: SHIPPED | OUTPATIENT
Start: 2018-09-18 | End: 2018-11-14 | Stop reason: SDUPTHER

## 2018-11-14 DIAGNOSIS — J44.9 COPD, MILD (HCC): ICD-10-CM

## 2018-11-14 RX ORDER — FLUTICASONE FUROATE AND VILANTEROL TRIFENATATE 100; 25 UG/1; UG/1
POWDER RESPIRATORY (INHALATION)
Qty: 1 INHALER | Refills: 2 | Status: SHIPPED | OUTPATIENT
Start: 2018-11-14

## 2018-11-21 ENCOUNTER — TRANSCRIBE ORDERS (OUTPATIENT)
Dept: ADMINISTRATIVE | Facility: HOSPITAL | Age: 68
End: 2018-11-21

## 2018-11-21 DIAGNOSIS — H90.0 CONDUCTIVE HEARING LOSS, BILATERAL: Primary | ICD-10-CM

## 2019-01-07 DIAGNOSIS — J44.9 COPD, MILD (HCC): ICD-10-CM

## 2019-01-07 RX ORDER — FLUTICASONE FUROATE AND VILANTEROL 100; 25 UG/1; UG/1
1 POWDER RESPIRATORY (INHALATION) DAILY
Qty: 1 INHALER | Refills: 0 | Status: SHIPPED | OUTPATIENT
Start: 2019-01-07 | End: 2019-02-04 | Stop reason: SDUPTHER

## 2019-02-04 DIAGNOSIS — J44.9 COPD, MILD (HCC): ICD-10-CM

## 2019-02-04 RX ORDER — FLUTICASONE FUROATE AND VILANTEROL TRIFENATATE 100; 25 UG/1; UG/1
1 POWDER RESPIRATORY (INHALATION) DAILY
Qty: 1 INHALER | Refills: 1 | Status: SHIPPED | OUTPATIENT
Start: 2019-02-04 | End: 2019-04-03 | Stop reason: SDUPTHER

## 2019-02-09 DIAGNOSIS — K21.9 GASTROESOPHAGEAL REFLUX DISEASE, ESOPHAGITIS PRESENCE NOT SPECIFIED: Primary | ICD-10-CM

## 2019-02-10 RX ORDER — RANITIDINE 150 MG/1
TABLET ORAL
Qty: 90 TABLET | Refills: 1 | Status: SHIPPED | OUTPATIENT
Start: 2019-02-10

## 2019-04-03 DIAGNOSIS — J44.9 COPD, MILD (HCC): ICD-10-CM

## 2019-04-03 RX ORDER — FLUTICASONE FUROATE AND VILANTEROL TRIFENATATE 100; 25 UG/1; UG/1
1 POWDER RESPIRATORY (INHALATION) DAILY
Qty: 1 INHALER | Refills: 4 | Status: SHIPPED | OUTPATIENT
Start: 2019-04-03

## 2020-03-16 ENCOUNTER — TELEPHONE (OUTPATIENT)
Dept: PSYCHIATRY | Facility: CLINIC | Age: 70
End: 2020-03-16